# Patient Record
Sex: MALE | Race: WHITE | Employment: FULL TIME
[De-identification: names, ages, dates, MRNs, and addresses within clinical notes are randomized per-mention and may not be internally consistent; named-entity substitution may affect disease eponyms.]

---

## 2020-03-25 ENCOUNTER — NURSE TRIAGE (OUTPATIENT)
Dept: OTHER | Facility: CLINIC | Age: 30
End: 2020-03-25

## 2021-02-06 ENCOUNTER — HOSPITAL ENCOUNTER (EMERGENCY)
Age: 31
Discharge: HOME | End: 2021-02-06
Attending: EMERGENCY MEDICINE
Payer: COMMERCIAL

## 2021-02-06 VITALS
SYSTOLIC BLOOD PRESSURE: 131 MMHG | TEMPERATURE: 98.2 F | HEIGHT: 73 IN | BODY MASS INDEX: 27.83 KG/M2 | OXYGEN SATURATION: 96 % | RESPIRATION RATE: 19 BRPM | WEIGHT: 210 LBS | DIASTOLIC BLOOD PRESSURE: 76 MMHG | HEART RATE: 97 BPM

## 2021-02-06 DIAGNOSIS — K29.21 ACUTE ALCOHOLIC GASTRITIS WITH HEMORRHAGE: Primary | ICD-10-CM

## 2021-02-06 LAB
A/G RATIO: 1.9 (ref 1.1–2.2)
ABO/RH: NORMAL
ALBUMIN SERPL-MCNC: 5.3 G/DL (ref 3.4–5)
ALP BLD-CCNC: 85 U/L (ref 40–129)
ALT SERPL-CCNC: 28 U/L (ref 10–40)
ANION GAP SERPL CALCULATED.3IONS-SCNC: 13 MMOL/L (ref 3–16)
ANTIBODY SCREEN: NORMAL
AST SERPL-CCNC: 36 U/L (ref 15–37)
BASOPHILS ABSOLUTE: 0 K/UL (ref 0–0.2)
BASOPHILS RELATIVE PERCENT: 0.6 %
BILIRUB SERPL-MCNC: 0.3 MG/DL (ref 0–1)
BUN BLDV-MCNC: 5 MG/DL (ref 7–20)
CALCIUM SERPL-MCNC: 9.4 MG/DL (ref 8.3–10.6)
CHLORIDE BLD-SCNC: 97 MMOL/L (ref 99–110)
CO2: 30 MMOL/L (ref 21–32)
CREAT SERPL-MCNC: 1.1 MG/DL (ref 0.9–1.3)
EOSINOPHILS ABSOLUTE: 0.1 K/UL (ref 0–0.6)
EOSINOPHILS RELATIVE PERCENT: 0.9 %
ETHANOL: 327 MG/DL (ref 0–0.08)
GFR AFRICAN AMERICAN: >60
GFR NON-AFRICAN AMERICAN: >60
GLOBULIN: 2.8 G/DL
GLUCOSE BLD-MCNC: 121 MG/DL (ref 70–99)
HCT VFR BLD CALC: 49.9 % (ref 40.5–52.5)
HEMOGLOBIN: 17.2 G/DL (ref 13.5–17.5)
INR BLD: 1.03 (ref 0.86–1.14)
LIPASE: 47 U/L (ref 13–60)
LYMPHOCYTES ABSOLUTE: 3 K/UL (ref 1–5.1)
LYMPHOCYTES RELATIVE PERCENT: 38.5 %
MCH RBC QN AUTO: 31.6 PG (ref 26–34)
MCHC RBC AUTO-ENTMCNC: 34.4 G/DL (ref 31–36)
MCV RBC AUTO: 92 FL (ref 80–100)
MONOCYTES ABSOLUTE: 0.6 K/UL (ref 0–1.3)
MONOCYTES RELATIVE PERCENT: 7.7 %
NEUTROPHILS ABSOLUTE: 4 K/UL (ref 1.7–7.7)
NEUTROPHILS RELATIVE PERCENT: 52.3 %
PDW BLD-RTO: 14.1 % (ref 12.4–15.4)
PLATELET # BLD: 250 K/UL (ref 135–450)
PMV BLD AUTO: 7.9 FL (ref 5–10.5)
POTASSIUM REFLEX MAGNESIUM: 4 MMOL/L (ref 3.5–5.1)
PROTHROMBIN TIME: 12 SEC (ref 10–13.2)
RBC # BLD: 5.43 M/UL (ref 4.2–5.9)
REASON FOR REJECTION: NORMAL
REJECTED TEST: NORMAL
SODIUM BLD-SCNC: 140 MMOL/L (ref 136–145)
TOTAL PROTEIN: 8.1 G/DL (ref 6.4–8.2)
WBC # BLD: 7.7 K/UL (ref 4–11)

## 2021-02-06 PROCEDURE — C9113 INJ PANTOPRAZOLE SODIUM, VIA: HCPCS | Performed by: EMERGENCY MEDICINE

## 2021-02-06 PROCEDURE — 86901 BLOOD TYPING SEROLOGIC RH(D): CPT

## 2021-02-06 PROCEDURE — 36415 COLL VENOUS BLD VENIPUNCTURE: CPT

## 2021-02-06 PROCEDURE — 96365 THER/PROPH/DIAG IV INF INIT: CPT

## 2021-02-06 PROCEDURE — 2580000003 HC RX 258: Performed by: EMERGENCY MEDICINE

## 2021-02-06 PROCEDURE — 85610 PROTHROMBIN TIME: CPT

## 2021-02-06 PROCEDURE — 96375 TX/PRO/DX INJ NEW DRUG ADDON: CPT

## 2021-02-06 PROCEDURE — 6360000002 HC RX W HCPCS: Performed by: EMERGENCY MEDICINE

## 2021-02-06 PROCEDURE — 82077 ASSAY SPEC XCP UR&BREATH IA: CPT

## 2021-02-06 PROCEDURE — 86850 RBC ANTIBODY SCREEN: CPT

## 2021-02-06 PROCEDURE — 99284 EMERGENCY DEPT VISIT MOD MDM: CPT

## 2021-02-06 PROCEDURE — 80053 COMPREHEN METABOLIC PANEL: CPT

## 2021-02-06 PROCEDURE — 83690 ASSAY OF LIPASE: CPT

## 2021-02-06 PROCEDURE — 86900 BLOOD TYPING SEROLOGIC ABO: CPT

## 2021-02-06 PROCEDURE — 85025 COMPLETE CBC W/AUTO DIFF WBC: CPT

## 2021-02-06 PROCEDURE — 96366 THER/PROPH/DIAG IV INF ADDON: CPT

## 2021-02-06 RX ORDER — SUCRALFATE 1 G/1
1 TABLET ORAL 4 TIMES DAILY
Qty: 28 TABLET | Refills: 0 | Status: SHIPPED | OUTPATIENT
Start: 2021-02-06 | End: 2021-02-13

## 2021-02-06 RX ORDER — ONDANSETRON 2 MG/ML
4 INJECTION INTRAMUSCULAR; INTRAVENOUS ONCE
Status: COMPLETED | OUTPATIENT
Start: 2021-02-06 | End: 2021-02-06

## 2021-02-06 RX ORDER — SODIUM CHLORIDE, SODIUM LACTATE, POTASSIUM CHLORIDE, CALCIUM CHLORIDE 600; 310; 30; 20 MG/100ML; MG/100ML; MG/100ML; MG/100ML
1000 INJECTION, SOLUTION INTRAVENOUS ONCE
Status: COMPLETED | OUTPATIENT
Start: 2021-02-06 | End: 2021-02-06

## 2021-02-06 RX ORDER — ONDANSETRON 4 MG/1
4 TABLET, ORALLY DISINTEGRATING ORAL EVERY 8 HOURS PRN
Qty: 10 TABLET | Refills: 0 | Status: SHIPPED | OUTPATIENT
Start: 2021-02-06

## 2021-02-06 RX ADMIN — SODIUM CHLORIDE 8 MG/HR: 9 INJECTION, SOLUTION INTRAVENOUS at 01:51

## 2021-02-06 RX ADMIN — SODIUM CHLORIDE, POTASSIUM CHLORIDE, SODIUM LACTATE AND CALCIUM CHLORIDE 1000 ML: 600; 310; 30; 20 INJECTION, SOLUTION INTRAVENOUS at 01:24

## 2021-02-06 RX ADMIN — PANTOPRAZOLE SODIUM 80 MG: 40 INJECTION, POWDER, FOR SOLUTION INTRAVENOUS at 01:33

## 2021-02-06 RX ADMIN — ONDANSETRON 4 MG: 2 INJECTION INTRAMUSCULAR; INTRAVENOUS at 03:44

## 2021-02-06 NOTE — ED NOTES
Patient resting in bed, denies needs at this time. Personal cell phone in stretcher. Call light in reach.      [facility]ater[facility]  02/06/21 6492

## 2021-02-06 NOTE — ED NOTES
Patient requesting water. Patient told reasoning for why he cannot have water. Patient got up and went to sink to drink from faucet. IV infusing at this time. Tavot returned to bed. Notified provider.       Suze RodrigesJefferson Health Northeast  02/06/21 0612

## 2021-02-06 NOTE — ED NOTES
Patient called out reporting hematemesis. Nurse and provider to room. Scant flakes to papertowel. Gastroccult collected and sent to lab per provider.       Nieves Florence, [facility]  02/06/21 8299

## 2021-02-06 NOTE — ED NOTES
--Patient provided with discharge instructions. --Instructions, and follow-up appointments reviewed with patient/family. No further questions or needs at this time. --Vital signs and patient stable upon discharge. --Patient ambulatory to [facility].          [facility]  02/06/21 2031

## 2021-02-06 NOTE — ED PROVIDER NOTES
CHIEF COMPLAINT  Hematemesis (Pt states he drank three fifths of whiskey. Last drink two hours ago. Pt states started throwing up blood a couple hours PTA.)      HISTORY OF PRESENT ILLNESS  Marty Hager is a 27 y.o. male with a history of alcoholism and tobacco abuse who presents to the ED complaining of hematemesis. Patient states he drank 2 or 3 5ths of whiskey today. This evening he vomited in the bathroom and observed red blood in the emesis. States this has never occurred before. Denies fever, chills, chest pain, abdominal pain, syncope, melena, hematochezia. No report of recent illness or trauma. Does not take blood thinners. No other complaints, modifying factors or associated symptoms. I have reviewed the following from the nursing documentation. Past Medical History:   Diagnosis Date    Alcohol problem drinking      Past Surgical History:   Procedure Laterality Date    TONSILLECTOMY       History reviewed. No pertinent family history. Social History     Socioeconomic History    Marital status: Single     Spouse name: Not on file    Number of children: Not on file    Years of education: Not on file    Highest education level: Not on file   Occupational History    Not on file   Social Needs    Financial resource strain: Not on file    Food insecurity     Worry: Not on file     Inability: Not on file    Transportation needs     Medical: Not on file     Non-medical: Not on file   Tobacco Use    Smoking status: Current Every Day Smoker     Packs/day: 0.50     Types: Cigarettes    Smokeless tobacco: Current User     Types: Snuff   Substance and Sexual Activity    Alcohol use:  Yes    Drug use: No    Sexual activity: Not on file   Lifestyle    Physical activity     Days per week: Not on file     Minutes per session: Not on file    Stress: Not on file   Relationships    Social connections     Talks on phone: Not on file     Gets together: Not on file     Attends Congregational service: Not on file     Active member of club or organization: Not on file     Attends meetings of clubs or organizations: Not on file     Relationship status: Not on file    Intimate partner violence     Fear of current or ex partner: Not on file     Emotionally abused: Not on file     Physically abused: Not on file     Forced sexual activity: Not on file   Other Topics Concern    Not on file   Social History Narrative    Not on file     Current Facility-Administered Medications   Medication Dose Route Frequency Provider Last Rate Last Admin    pantoprazole (PROTONIX) 80 mg in sodium chloride 0.9 % 100 mL infusion  8 mg/hr Intravenous Continuous Kelsea Roman MD 10 mL/hr at 02/06/21 0151 8 mg/hr at 02/06/21 0151     Current Outpatient Medications   Medication Sig Dispense Refill    sucralfate (CARAFATE) 1 GM tablet Take 1 tablet by mouth 4 times daily for 7 days 28 tablet 0    esomeprazole (NEXIUM) 20 MG delayed release capsule Take 1 capsule by mouth 2 times daily for 14 days 28 capsule 0    ondansetron (ZOFRAN ODT) 4 MG disintegrating tablet Take 1 tablet by mouth every 8 hours as needed for Nausea or Vomiting 10 tablet 0     No Known Allergies    REVIEW OF SYSTEMS  10 systems reviewed, pertinent positives per HPI otherwise noted to be negative. PHYSICAL EXAM  /76   Pulse 90   Temp 98.2 °F (36.8 °C) (Oral)   Resp 19   Ht 6' 1\" (1.854 m)   Wt 210 lb (95.3 kg)   SpO2 96%   BMI 27.71 kg/m²   GENERAL APPEARANCE: Awake and alert. Cooperative. No acute distress. Odor of alcohol. HEAD: Normocephalic. Atraumatic. EYES: PERRL. EOM's grossly intact. ENT: Mucous membranes are moist.   NECK: Supple, trachea midline. HEART: RRR. Normal S1S2, no rubs, gallops, or murmurs noted  LUNGS: Respirations unlabored. CTAB. Good air exchange. No wheezes, rales, or rhonchi. Speaking comfortably in full sentences. ABDOMEN: Soft. Non-distended. Non-tender. No guarding or rebound.  Normal bowel sounds. EXTREMITIES: No peripheral edema. MAEE. No acute deformities. SKIN: Warm and dry. No acute rashes. NEUROLOGICAL: Alert and oriented X 3. CN II-XII intact. No gross facial drooping. Strength 5/5, sensation intact. Gait normal.   PSYCHIATRIC: Normal mood and affect. LABS  I have reviewed all labs for this visit.    Results for orders placed or performed during the hospital encounter of 02/06/21   Comprehensive Metabolic Panel w/ Reflex to MG   Result Value Ref Range    Sodium 140 136 - 145 mmol/L    Potassium reflex Magnesium 4.0 3.5 - 5.1 mmol/L    Chloride 97 (L) 99 - 110 mmol/L    CO2 30 21 - 32 mmol/L    Anion Gap 13 3 - 16    Glucose 121 (H) 70 - 99 mg/dL    BUN 5 (L) 7 - 20 mg/dL    CREATININE 1.1 0.9 - 1.3 mg/dL    GFR Non-African American >60 >60    GFR African American >60 >60    Calcium 9.4 8.3 - 10.6 mg/dL    Total Protein 8.1 6.4 - 8.2 g/dL    Albumin 5.3 (H) 3.4 - 5.0 g/dL    Albumin/Globulin Ratio 1.9 1.1 - 2.2    Total Bilirubin 0.3 0.0 - 1.0 mg/dL    Alkaline Phosphatase 85 40 - 129 U/L    ALT 28 10 - 40 U/L    AST 36 15 - 37 U/L    Globulin 2.8 g/dL   Ethanol   Result Value Ref Range    Ethanol Lvl 327 mg/dL   Lipase   Result Value Ref Range    Lipase 47.0 13.0 - 60.0 U/L   Protime-INR   Result Value Ref Range    Protime 12.0 10.0 - 13.2 sec    INR 1.03 0.86 - 1.14   CBC Auto Differential   Result Value Ref Range    WBC 7.7 4.0 - 11.0 K/uL    RBC 5.43 4.20 - 5.90 M/uL    Hemoglobin 17.2 13.5 - 17.5 g/dL    Hematocrit 49.9 40.5 - 52.5 %    MCV 92.0 80.0 - 100.0 fL    MCH 31.6 26.0 - 34.0 pg    MCHC 34.4 31.0 - 36.0 g/dL    RDW 14.1 12.4 - 15.4 %    Platelets 545 766 - 499 K/uL    MPV 7.9 5.0 - 10.5 fL    Neutrophils % 52.3 %    Lymphocytes % 38.5 %    Monocytes % 7.7 %    Eosinophils % 0.9 %    Basophils % 0.6 %    Neutrophils Absolute 4.0 1.7 - 7.7 K/uL    Lymphocytes Absolute 3.0 1.0 - 5.1 K/uL    Monocytes Absolute 0.6 0.0 - 1.3 K/uL    Eosinophils Absolute 0.1 0.0 - 0.6 K/uL Basophils Absolute 0.0 0.0 - 0.2 K/uL   SPECIMEN REJECTION   Result Value Ref Range    Rejected Test 7OBG     Reason for Rejection see below    TYPE AND SCREEN   Result Value Ref Range    ABO/Rh A POS     Antibody Screen NEG          RADIOLOGY  X-RAYS:  I have reviewed radiologic plain film image(s). ALL OTHER NON-PLAIN FILM IMAGES SUCH AS CT, ULTRASOUND AND MRI HAVE BEEN READ BY THE RADIOLOGIST. No orders to display              Rechecks: Physical assessment performed. Appears comfortable and calm on reevaluation. ED COURSE/MDM  Patient seen and evaluated. Old records reviewed. Labs and imaging reviewed and results discussed with patient. Patient reports an episode of hematemesis prior to arrival.  Here in the emergency department he had a tiny amount of emesis which was too small for the lab to utilize for Gastroccult card. I did not observe any bright red blood. Patient is hemodynamically stable and labs unremarkable. Case discussed with Dr. Marcelo Baker and plan is to start him on a PPI, Carafate, with outpatient follow-up on Monday and plan for upper endoscopy. Patient advised to take medications as prescribed, limit his alcohol consumption, return to the ER with any further evidence of bleeding. Patient was given scripts for the following medications. I counseled patient how to take these medications. New Prescriptions    ESOMEPRAZOLE (NEXIUM) 20 MG DELAYED RELEASE CAPSULE    Take 1 capsule by mouth 2 times daily for 14 days    ONDANSETRON (ZOFRAN ODT) 4 MG DISINTEGRATING TABLET    Take 1 tablet by mouth every 8 hours as needed for Nausea or Vomiting    SUCRALFATE (CARAFATE) 1 GM TABLET    Take 1 tablet by mouth 4 times daily for 7 days       CLINICAL IMPRESSION  1. Acute alcoholic gastritis with hemorrhage        Blood pressure 131/76, pulse 90, temperature 98.2 °F (36.8 °C), temperature source Oral, resp.  rate 19, height 6' 1\" (1.854 m), weight 210 lb (95.3 kg), SpO2 96 %. DISPOSITION  [facility] was discharged to home in stable condition.         Joel West MD  02/06/21 7501

## 2021-02-06 NOTE — ED NOTES
Patient ambulated to the restroom at this time with steady gait.       Keerthi HedrickC[facility]  02/06/21 9216

## 2023-01-22 ENCOUNTER — APPOINTMENT (OUTPATIENT)
Dept: CT IMAGING | Age: 33
End: 2023-01-22
Payer: OTHER MISCELLANEOUS

## 2023-01-22 ENCOUNTER — HOSPITAL ENCOUNTER (EMERGENCY)
Age: 33
Discharge: HOME | End: 2023-01-22
Payer: OTHER MISCELLANEOUS

## 2023-01-22 ENCOUNTER — APPOINTMENT (OUTPATIENT)
Dept: GENERAL RADIOLOGY | Age: 33
End: 2023-01-22
Payer: OTHER MISCELLANEOUS

## 2023-01-22 VITALS
WEIGHT: 210 LBS | DIASTOLIC BLOOD PRESSURE: 80 MMHG | OXYGEN SATURATION: 100 % | HEIGHT: 74 IN | BODY MASS INDEX: 26.95 KG/M2 | SYSTOLIC BLOOD PRESSURE: 151 MMHG | RESPIRATION RATE: 14 BRPM | TEMPERATURE: 98.4 F | HEART RATE: 72 BPM

## 2023-01-22 DIAGNOSIS — S63.502A SPRAIN OF LEFT WRIST, INITIAL ENCOUNTER: ICD-10-CM

## 2023-01-22 DIAGNOSIS — S01.311A LACERATION OF EARLOBE, RIGHT, INITIAL ENCOUNTER: Primary | ICD-10-CM
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
## 2023-01-22 DIAGNOSIS — S63.91XA SPRAIN OF RIGHT HAND, INITIAL ENCOUNTER: ICD-10-CM

## 2023-01-22 DIAGNOSIS — Z23 NEED FOR DIPHTHERIA-TETANUS-PERTUSSIS (TDAP) VACCINE: ICD-10-CM

## 2023-01-22 DIAGNOSIS — S20.219A CONTUSION OF CHEST WALL, UNSPECIFIED LATERALITY, INITIAL ENCOUNTER: ICD-10-CM

## 2023-01-22 DIAGNOSIS — J18.9 PNEUMONIA OF RIGHT LOWER LOBE DUE TO INFECTIOUS ORGANISM: ICD-10-CM

## 2023-01-22 PROCEDURE — 6360000002 HC RX W HCPCS: Performed by: PHYSICIAN ASSISTANT

## 2023-01-22 PROCEDURE — 90715 TDAP VACCINE 7 YRS/> IM: CPT | Performed by: PHYSICIAN ASSISTANT

## 2023-01-22 PROCEDURE — 90471 IMMUNIZATION ADMIN: CPT | Performed by: PHYSICIAN ASSISTANT

## 2023-01-22 PROCEDURE — 73130 X-RAY EXAM OF HAND: CPT

## 2023-01-22 PROCEDURE — 12011 RPR F/E/E/N/L/M 2.5 CM/<: CPT

## 2023-01-22 PROCEDURE — 71250 CT THORAX DX C-: CPT

## 2023-01-22 PROCEDURE — 99284 EMERGENCY DEPT VISIT MOD MDM: CPT

## 2023-01-22 PROCEDURE — 6370000000 HC RX 637 (ALT 250 FOR IP): Performed by: PHYSICIAN ASSISTANT

## 2023-01-22 RX ORDER — ACETAMINOPHEN 500 MG
1000 TABLET ORAL ONCE
Status: COMPLETED | OUTPATIENT
Start: 2023-01-22 | End: 2023-01-22

## 2023-01-22 RX ORDER — METHOCARBAMOL 500 MG/1
500 TABLET, FILM COATED ORAL EVERY 6 HOURS PRN
Qty: 30 TABLET | Refills: 0 | Status: SHIPPED | OUTPATIENT
Start: 2023-01-22 | End: 2023-02-01

## 2023-01-22 RX ORDER — LIDOCAINE HYDROCHLORIDE AND EPINEPHRINE 10; 10 MG/ML; UG/ML
20 INJECTION, SOLUTION INFILTRATION; PERINEURAL ONCE
Status: DISCONTINUED | OUTPATIENT
Start: 2023-01-22 | End: 2023-01-22 | Stop reason: HOSPADM

## 2023-01-22 RX ORDER — IBUPROFEN 600 MG/1
600 TABLET ORAL
Qty: 30 TABLET | Refills: 0 | Status: SHIPPED | OUTPATIENT
Start: 2023-01-22

## 2023-01-22 RX ORDER — DOXYCYCLINE HYCLATE 100 MG
100 TABLET ORAL 2 TIMES DAILY
Qty: 20 TABLET | Refills: 0 | Status: SHIPPED | OUTPATIENT
Start: 2023-01-22 | End: 2023-02-01

## 2023-01-22 RX ORDER — IBUPROFEN 600 MG/1
600 TABLET ORAL ONCE
Status: COMPLETED | OUTPATIENT
Start: 2023-01-22 | End: 2023-01-22

## 2023-01-22 RX ADMIN — TETANUS TOXOID, REDUCED DIPHTHERIA TOXOID AND ACELLULAR PERTUSSIS VACCINE, ADSORBED 0.5 ML: 5; 2.5; 8; 8; 2.5 SUSPENSION INTRAMUSCULAR at 11:59

## 2023-01-22 RX ADMIN — ACETAMINOPHEN 1000 MG: 500 TABLET ORAL at 11:59

## 2023-01-22 RX ADMIN — IBUPROFEN 600 MG: 600 TABLET, FILM COATED ORAL at 11:59

## 2023-01-22 ASSESSMENT — PAIN - FUNCTIONAL ASSESSMENT
PAIN_FUNCTIONAL_ASSESSMENT: NONE - DENIES PAIN
PAIN_FUNCTIONAL_ASSESSMENT: 0-10

## 2023-01-22 ASSESSMENT — PAIN SCALES - GENERAL
PAINLEVEL_OUTOF10: 4
PAINLEVEL_OUTOF10: 0
PAINLEVEL_OUTOF10: 4
PAINLEVEL_OUTOF10: 0

## 2023-01-22 ASSESSMENT — PAIN DESCRIPTION - DESCRIPTORS: DESCRIPTORS: ACHING

## 2023-01-22 ASSESSMENT — PAIN DESCRIPTION - ONSET: ONSET: GRADUAL

## 2023-01-22 ASSESSMENT — PAIN DESCRIPTION - FREQUENCY: FREQUENCY: CONTINUOUS

## 2023-01-22 ASSESSMENT — PAIN DESCRIPTION - LOCATION: LOCATION: ARM;RIB CAGE

## 2023-01-22 ASSESSMENT — PAIN DESCRIPTION - PAIN TYPE: TYPE: ACUTE PAIN

## 2023-01-22 NOTE — DISCHARGE INSTRUCTIONS
CT scan of your chest showed no rib fracture. It showed a questionable right lower lobe pneumonia. Doxycycline will be prescribed in the event this is pneumonia or you have developing pneumonia. CT scan did not show any evidence of rib fracture. I do recommend taking periodic deep breaths to prevent further development of pneumonia or pneumonia prevention overall. I do recommend ibuprofen 600 mg 3 times daily. I also recommend Tylenol 1000 g 3 times daily as primary pain control. I prescribed muscle relaxer in the event you have general muscle tenderness. This will help with spasm. Hot bath may benefit. With regard to the laceration of the earlobe I did use chromic suture which should come out over the next 7 days. You may have suture removal in 7 days if you would rather. Use this to be done at your healthcare provider where this emergency facility.
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
## 2023-10-26 ENCOUNTER — OFFICE (OUTPATIENT)
Dept: URBAN - METROPOLITAN AREA CLINIC 17 | Facility: CLINIC | Age: 33
End: 2023-10-26